# Patient Record
(demographics unavailable — no encounter records)

---

## 2024-10-31 NOTE — HISTORY OF PRESENT ILLNESS
[FreeTextEntry1] : HPI: Patient is a 31yo female presenting for her well woman exam. Patient is without complaints today.   ROS: neg unless specified in HPI   Gyn Hx: Menses: monthly No known fibroids, ovarian cysts, or other gynecologic problems Pap: NILM with pos hr hpv other Contraception: OCPs Breast: breast feeding, recent  24   Gyn: Normal appearing external genitalia, normal appearing vagina and cervix, no CMT, bimanual with normal sized mobile uterus, no fixed adnexal masses or tenderness  Breast: No lymphadenopathy in the neck, chest wall, bilateral supraclavicular, infraclavicular, and bilateral axillary areas. No overt asymmetry in bilateral breast contour with normal appearing skin and normal appearing nipple areolar complex bilaterally. No nipple discharge expressed.   Plan: Gyn Screening: - Pap: cytology + automatic HR HPV sent - Contraception: continue OCPs - Recommended HPV vaccine  Breast Screening: Pt notes bilateral breast pain - Discussed self-breast exam - Clinical breast exam performed - breast US ordered for breast pain  AHM: CV, Pulmonary, Endocrine, GI, Bone Screening, Vitamin supplementation, and Immunizations with PCP  NIDIA Do MD